# Patient Record
Sex: MALE | Race: WHITE | HISPANIC OR LATINO | Employment: OTHER | URBAN - METROPOLITAN AREA
[De-identification: names, ages, dates, MRNs, and addresses within clinical notes are randomized per-mention and may not be internally consistent; named-entity substitution may affect disease eponyms.]

---

## 2018-08-31 ENCOUNTER — HOSPITAL ENCOUNTER (EMERGENCY)
Facility: HOSPITAL | Age: 28
Discharge: HOME | End: 2018-08-31
Attending: EMERGENCY MEDICINE
Payer: OTHER GOVERNMENT

## 2018-08-31 VITALS
BODY MASS INDEX: 27.13 KG/M2 | WEIGHT: 179 LBS | HEART RATE: 63 BPM | TEMPERATURE: 99 F | RESPIRATION RATE: 18 BRPM | SYSTOLIC BLOOD PRESSURE: 140 MMHG | DIASTOLIC BLOOD PRESSURE: 70 MMHG | HEIGHT: 68 IN | OXYGEN SATURATION: 98 %

## 2018-08-31 DIAGNOSIS — S39.012A STRAIN OF LUMBAR REGION, INITIAL ENCOUNTER: Primary | ICD-10-CM

## 2018-08-31 PROCEDURE — 96372 THER/PROPH/DIAG INJ SC/IM: CPT

## 2018-08-31 PROCEDURE — 99283 EMERGENCY DEPT VISIT LOW MDM: CPT | Mod: 25

## 2018-08-31 PROCEDURE — 63600175 PHARM REV CODE 636 W HCPCS: Performed by: PHYSICIAN ASSISTANT

## 2018-08-31 RX ORDER — KETOROLAC TROMETHAMINE 30 MG/ML
15 INJECTION, SOLUTION INTRAMUSCULAR; INTRAVENOUS
Status: COMPLETED | OUTPATIENT
Start: 2018-08-31 | End: 2018-08-31

## 2018-08-31 RX ORDER — MELOXICAM 7.5 MG/1
7.5 TABLET ORAL DAILY
Qty: 12 TABLET | Refills: 0 | Status: SHIPPED | OUTPATIENT
Start: 2018-08-31

## 2018-08-31 RX ORDER — LIDOCAINE 50 MG/G
1 PATCH TOPICAL DAILY
Qty: 6 PATCH | Refills: 0 | Status: SHIPPED | OUTPATIENT
Start: 2018-08-31

## 2018-08-31 RX ORDER — ORPHENADRINE CITRATE 100 MG/1
100 TABLET, EXTENDED RELEASE ORAL 2 TIMES DAILY
Qty: 8 TABLET | Refills: 0 | Status: SHIPPED | OUTPATIENT
Start: 2018-08-31 | End: 2018-09-04

## 2018-08-31 RX ADMIN — KETOROLAC TROMETHAMINE 15 MG: 30 INJECTION INTRAMUSCULAR; INTRAVENOUS at 03:08

## 2018-08-31 NOTE — ED PROVIDER NOTES
"Encounter Date: 8/31/2018       History     Chief Complaint   Patient presents with    Back Pain     pt here for lower back pain x4 days. reports "doing more exercise than normal".     27 yo M with no PMHx presents to the ED for R sided "sharp", positional, and reproducible low back pain after new exercise routine. Denies trauma, fever, numbness, abdominal pain, CP, and SOB. Temporary relief with Motrin; last dose taken yesterday. Symptoms are overall improving since onset. Patient is going on vacation soon and wants to make he will be okay to go on vacation. No Hx of similar symptoms. Denies urinary symptoms/incontinence. Denies IV drug use. No chronic steroid use or personal Hx of cancer.           Review of patient's allergies indicates:  No Known Allergies  Past Medical History:   Diagnosis Date    G6PD deficiency      History reviewed. No pertinent surgical history.  History reviewed. No pertinent family history.  Social History     Tobacco Use    Smoking status: Never Smoker    Smokeless tobacco: Never Used   Substance Use Topics    Alcohol use: No     Frequency: Never    Drug use: Not on file     Review of Systems   Constitutional: Negative for fever.   Eyes: Negative for visual disturbance.   Respiratory: Negative for shortness of breath.    Cardiovascular: Negative for chest pain.   Gastrointestinal: Negative for abdominal pain, nausea and vomiting.   Musculoskeletal: Positive for back pain. Negative for gait problem and neck pain.   Skin: Negative for color change and rash.   Neurological: Negative for dizziness, weakness, numbness and headaches.   All other systems reviewed and are negative.      Physical Exam     Initial Vitals [08/31/18 1512]   BP Pulse Resp Temp SpO2   (!) 140/70 63 18 98.7 °F (37.1 °C) 98 %      MAP       --         Physical Exam    Nursing note and vitals reviewed.  Constitutional: He appears well-developed and well-nourished. He is not diaphoretic. No distress.   HENT:   Head: " Normocephalic and atraumatic.   Nose: Nose normal.   Eyes: Conjunctivae and EOM are normal. Right eye exhibits no discharge. Left eye exhibits no discharge.   Neck: Normal range of motion. No tracheal deviation present. No JVD present.   Cardiovascular: Normal rate, regular rhythm and normal heart sounds. Exam reveals no friction rub.    No murmur heard.  Pulmonary/Chest: Breath sounds normal. No stridor. No respiratory distress. He has no wheezes. He has no rhonchi. He has no rales. He exhibits no tenderness.   Abdominal: Soft. He exhibits no distension. There is no tenderness. There is no rigidity, no rebound, no guarding, no CVA tenderness, no tenderness at McBurney's point and negative Hsu's sign.   Musculoskeletal: Normal range of motion.   R lower back pain reproducible with distracting movements. No midline tenderness or bony deformities noted down the neck and spine. No bony TTP to the hips. No overlying skin abnormalities. Ambulating well, without limp or pain. (-) SLRs. Pedal pulses 2+ and equal.    Neurological: He is alert and oriented to person, place, and time. He displays no tremor. He displays no seizure activity. Coordination and gait normal.   Skin: Skin is warm and dry. No rash and no abscess noted. No erythema. No pallor.         ED Course   Procedures  Labs Reviewed - No data to display       Imaging Results    None          Medical Decision Making:   History:   Old Medical Records: I decided to obtain old medical records.      This is an emergent evaluation of a 28 y.o. male with no PMHx presenting to the ED for low back pain after exercise. Denies traumatic injury, personal Hx of cancer, chronic steroid use, Hx of IV drug use, fever, urinary retention/loss of bowel bladder control, urinary symptoms, SOB, and chest pain. Vitals WNL, afebrile. Patient is non-toxic appearing and in no acute distress. Ambulatory. No neurological deficits.    Presentation most consistent with muscle strain. No  Hx of trauma to suggest acute vertebral fracture/dislocation, or warrant emergent imaging at this time. I have also considered but doubt cauda equina, AAA rupture, ureteral stone, PE, and PTX. I have a low clinical suspicion for infectious etiology, including for epidural abscess, vertebral osteomyelitis, and pyelonephritis. No HZV. I've considered but have a lower suspicion for neoplastic etiology requiring emergent imaging at this time.     Acute pain controlled in ED. Discharged home with supportive care. I instructed the patient to follow up with PCP and orthopedics for reevaluation and management of symptoms. An educational resource on back pain and back care tips is issued to the patient.     I discussed with the patient the diagnosis, treatment plan, indications for return to the emergency department, and for expected follow-up. The patient verbalized an understanding. The patient is asked if there are any questions or concerns. We discuss the case, until all issues are addressed to the patients satisfaction. Patient understands and is agreeable to the plan.                     Clinical Impression:   The encounter diagnosis was Strain of lumbar region, initial encounter.                             Jorge Maldonado PA-C  08/31/18 1531

## 2019-07-05 PROCEDURE — 99283 EMERGENCY DEPT VISIT LOW MDM: CPT | Mod: 25

## 2019-07-06 ENCOUNTER — HOSPITAL ENCOUNTER (EMERGENCY)
Facility: HOSPITAL | Age: 29
Discharge: HOME | End: 2019-07-06
Attending: EMERGENCY MEDICINE
Payer: OTHER GOVERNMENT

## 2019-07-06 VITALS
HEIGHT: 69 IN | WEIGHT: 185 LBS | SYSTOLIC BLOOD PRESSURE: 132 MMHG | RESPIRATION RATE: 18 BRPM | DIASTOLIC BLOOD PRESSURE: 72 MMHG | OXYGEN SATURATION: 99 % | BODY MASS INDEX: 27.4 KG/M2 | TEMPERATURE: 98 F | HEART RATE: 76 BPM

## 2019-07-06 DIAGNOSIS — S20.212A RIB CONTUSION, LEFT, INITIAL ENCOUNTER: Primary | ICD-10-CM

## 2019-07-06 DIAGNOSIS — R10.9 LEFT FLANK PAIN: ICD-10-CM

## 2019-07-06 PROCEDURE — 99900035 HC TECH TIME PER 15 MIN (STAT)

## 2019-07-06 PROCEDURE — 94761 N-INVAS EAR/PLS OXIMETRY MLT: CPT

## 2019-07-06 PROCEDURE — 94799 UNLISTED PULMONARY SVC/PX: CPT

## 2019-07-06 RX ORDER — MELOXICAM 7.5 MG/1
7.5 TABLET ORAL DAILY
Qty: 12 TABLET | Refills: 0 | Status: SHIPPED | OUTPATIENT
Start: 2019-07-06

## 2019-07-06 NOTE — ED TRIAGE NOTES
Patient was playing basketball Monday and heard something snap. Patient thinks he may have broken something anterior left side. Patient rate pain 4/10

## 2019-07-06 NOTE — ED PROVIDER NOTES
"Encounter Date: 7/5/2019       History     Chief Complaint   Patient presents with    Rib Pain     Pt reports he was tackled to his left back area while playing basketball and heard/felt something "snap" to the left rib area. Incident happened on this past Monday but pt reports the discomfort has not resolved. Pt denies chest pain or difficulty breathing. Reports increased rib pain with minimal movement/bending.      29-year-old male with no pertinent past medical history presents to the emergency department for 6 day history of left upper flank pain after he sustained a tackle to the left flank.  Patient states his symptoms overall fairly mild but want to ensure that he did not sustain a rib fracture as he heard a popping noise.  He has not attempted any medication for symptoms. Denies chest pain, shortness of breath, fever, nausea, vomiting, and abdominal pain.        Review of patient's allergies indicates:  No Known Allergies  Past Medical History:   Diagnosis Date    G6PD deficiency      Past Surgical History:   Procedure Laterality Date    EYE SURGERY       History reviewed. No pertinent family history.  Social History     Tobacco Use    Smoking status: Never Smoker    Smokeless tobacco: Never Used   Substance Use Topics    Alcohol use: No     Frequency: Never    Drug use: Never     Review of Systems   Constitutional: Negative for fever.   Respiratory: Negative for shortness of breath.    Cardiovascular: Negative for chest pain.   Gastrointestinal: Negative for abdominal pain, nausea and vomiting.   Genitourinary: Positive for frequency. Negative for dysuria and hematuria.   Musculoskeletal: Negative for back pain.   Skin: Negative for wound.   Neurological: Negative for numbness and headaches.   All other systems reviewed and are negative.      Physical Exam     Initial Vitals [07/05/19 2324]   BP Pulse Resp Temp SpO2   134/80 73 20 98.2 °F (36.8 °C) 97 %      MAP       --         Physical " Exam    Nursing note and vitals reviewed.  Constitutional: He appears well-developed and well-nourished. He is not diaphoretic. No distress.   HENT:   Head: Normocephalic and atraumatic.   Nose: Nose normal.   Eyes: Conjunctivae and EOM are normal. Pupils are equal, round, and reactive to light. Right eye exhibits no discharge. Left eye exhibits no discharge.   Neck: Normal range of motion. No tracheal deviation present. No JVD present.   Cardiovascular: Normal rate, regular rhythm and normal heart sounds. Exam reveals no friction rub.    No murmur heard.  Pulmonary/Chest: Breath sounds normal. No stridor. No respiratory distress. He has no wheezes. He has no rhonchi. He has no rales. He exhibits no tenderness.   Abdominal: Soft. He exhibits no distension. There is no tenderness. There is no rigidity, no rebound, no guarding, no CVA tenderness, no tenderness at McBurney's point and negative Hsu's sign.   Musculoskeletal: Normal range of motion.   Reproducible tenderness of the left upper flank without bruising or gross bony deformity.   Neurological: He is alert and oriented to person, place, and time.   Skin: Skin is warm and dry. No rash and no abscess noted. No erythema. No pallor.         ED Course   Procedures  Labs Reviewed - No data to display       Imaging Results          X-Ray Chest PA And Lateral (Final result)  Result time 07/06/19 00:18:56    Final result by Alan Braun MD (07/06/19 00:18:56)                 Impression:      No acute cardiopulmonary process.      Electronically signed by: Alan Braun MD  Date:    07/06/2019  Time:    00:18             Narrative:    EXAMINATION:  XR CHEST PA AND LATERAL    CLINICAL HISTORY:  Unspecified abdominal pain    TECHNIQUE:  PA and lateral views of the chest were performed.    COMPARISON:  None.    FINDINGS:  There is no consolidation, effusion, or pneumothorax.    Cardiomediastinal silhouette is unremarkable.    Regional osseous structures are  unremarkable.                               X-Ray Ribs 2 View Left (Final result)  Result time 07/06/19 00:19:19    Final result by Alan Braun MD (07/06/19 00:19:19)                 Impression:      No displaced fracture or bone destruction involving the left rib cage.      Electronically signed by: Alan Braun MD  Date:    07/06/2019  Time:    00:19             Narrative:    EXAMINATION:  XR RIBS 2 VIEW LEFT    CLINICAL HISTORY:  Unspecified abdominal pain    TECHNIQUE:  Two views of the left ribs were performed.    COMPARISON:  None.    FINDINGS:  No displaced fracture or bone destruction involving the left rib cage.                                 Medical Decision Making:   History:   Old Medical Records: I decided to obtain old medical records.  Initial Assessment:   29-year-old male with left flank pain  Independently Interpreted Test(s):   I have ordered and independently interpreted X-rays - see prior notes.  Clinical Tests:   Radiological Study: Ordered and Reviewed  ED Management:  X-ray showed no acute fracture, dislocation, or pneumothorax.  Most consistent with bony contusion/muscle strain.  No abdominal pain or tenderness to suggest concurrent splenic injury. Issued incentive spirometer we discuss precautions for secondary pneumonia.  Strict return precautions discussed with patient.  Sent home with supportive care.  Advising follow-up with PCP.  Patient agreeable to plan.                      Clinical Impression:       ICD-10-CM ICD-9-CM   1. Rib contusion, left, initial encounter S20.212A 922.1   2. Left flank pain R10.9 789.09                                Jorge Maldonado PA-C  07/06/19 0454